# Patient Record
Sex: MALE | NOT HISPANIC OR LATINO | URBAN - METROPOLITAN AREA
[De-identification: names, ages, dates, MRNs, and addresses within clinical notes are randomized per-mention and may not be internally consistent; named-entity substitution may affect disease eponyms.]

---

## 2018-08-16 ENCOUNTER — IMPORTED ENCOUNTER (OUTPATIENT)
Dept: URBAN - METROPOLITAN AREA CLINIC 31 | Facility: CLINIC | Age: 73
End: 2018-08-16

## 2018-08-16 PROBLEM — H40.1232: Noted: 2018-08-16

## 2018-08-16 PROBLEM — Z96.1: Noted: 2018-08-16

## 2018-08-16 PROCEDURE — 99204 OFFICE O/P NEW MOD 45 MIN: CPT

## 2018-08-16 PROCEDURE — 92286 ANT SGM IMG I&R SPECLR MIC: CPT

## 2018-08-29 ENCOUNTER — IMPORTED ENCOUNTER (OUTPATIENT)
Dept: URBAN - METROPOLITAN AREA CLINIC 31 | Facility: CLINIC | Age: 73
End: 2018-08-29

## 2018-08-29 PROBLEM — Z98.89: Noted: 2018-08-29

## 2018-08-29 PROCEDURE — 99024 POSTOP FOLLOW-UP VISIT: CPT

## 2018-08-30 ENCOUNTER — IMPORTED ENCOUNTER (OUTPATIENT)
Dept: URBAN - METROPOLITAN AREA CLINIC 31 | Facility: CLINIC | Age: 73
End: 2018-08-30

## 2018-08-30 PROBLEM — Z98.89: Noted: 2018-08-30

## 2018-08-30 PROCEDURE — 99024 POSTOP FOLLOW-UP VISIT: CPT

## 2018-09-05 ENCOUNTER — IMPORTED ENCOUNTER (OUTPATIENT)
Dept: URBAN - METROPOLITAN AREA CLINIC 31 | Facility: CLINIC | Age: 73
End: 2018-09-05

## 2018-09-05 PROBLEM — Z98.89: Noted: 2018-09-05

## 2018-09-05 PROCEDURE — 99024 POSTOP FOLLOW-UP VISIT: CPT

## 2018-09-13 ENCOUNTER — IMPORTED ENCOUNTER (OUTPATIENT)
Dept: URBAN - METROPOLITAN AREA CLINIC 31 | Facility: CLINIC | Age: 73
End: 2018-09-13

## 2018-09-13 PROBLEM — Z98.89: Noted: 2018-09-13

## 2018-09-13 PROCEDURE — 99024 POSTOP FOLLOW-UP VISIT: CPT

## 2018-10-10 ENCOUNTER — IMPORTED ENCOUNTER (OUTPATIENT)
Dept: URBAN - METROPOLITAN AREA CLINIC 31 | Facility: CLINIC | Age: 73
End: 2018-10-10

## 2018-10-10 PROBLEM — Z96.1: Noted: 2018-10-10

## 2018-10-10 PROBLEM — H43.811: Noted: 2018-10-10

## 2018-10-10 PROBLEM — H40.1132: Noted: 2018-10-10

## 2018-10-10 PROBLEM — H43.813: Noted: 2018-10-10

## 2018-10-10 PROBLEM — Z98.89: Noted: 2018-10-10

## 2018-10-10 PROCEDURE — 99024 POSTOP FOLLOW-UP VISIT: CPT

## 2019-08-06 ENCOUNTER — UNSCHEDULED FOLLOW UP (OUTPATIENT)
Dept: URBAN - METROPOLITAN AREA CLINIC 26 | Facility: CLINIC | Age: 74
End: 2019-08-06

## 2019-08-06 VITALS
HEART RATE: 63 BPM | HEIGHT: 64 IN | BODY MASS INDEX: 23.56 KG/M2 | WEIGHT: 138 LBS | SYSTOLIC BLOOD PRESSURE: 116 MMHG | DIASTOLIC BLOOD PRESSURE: 68 MMHG

## 2019-08-06 DIAGNOSIS — H43.813: ICD-10-CM

## 2019-08-06 DIAGNOSIS — H04.123: ICD-10-CM

## 2019-08-06 DIAGNOSIS — H02.836: ICD-10-CM

## 2019-08-06 DIAGNOSIS — H40.1130: ICD-10-CM

## 2019-08-06 DIAGNOSIS — H02.833: ICD-10-CM

## 2019-08-06 DIAGNOSIS — H53.8: ICD-10-CM

## 2019-08-06 DIAGNOSIS — H35.373: ICD-10-CM

## 2019-08-06 PROCEDURE — 92014 COMPRE OPH EXAM EST PT 1/>: CPT

## 2019-08-06 PROCEDURE — 92250 FUNDUS PHOTOGRAPHY W/I&R: CPT

## 2019-08-06 PROCEDURE — 92083 EXTENDED VISUAL FIELD XM: CPT

## 2019-08-06 ASSESSMENT — TONOMETRY
OD_IOP_MMHG: 14
OS_IOP_MMHG: 15

## 2019-08-06 ASSESSMENT — VISUAL ACUITY
OD_CC: 20/30
OS_CC: 20/30-1

## 2020-02-25 ENCOUNTER — UNSCHEDULED FOLLOW UP (OUTPATIENT)
Dept: URBAN - METROPOLITAN AREA CLINIC 26 | Facility: CLINIC | Age: 75
End: 2020-02-25

## 2020-02-25 VITALS — WEIGHT: 136 LBS | BODY MASS INDEX: 23.22 KG/M2 | HEIGHT: 64 IN

## 2020-02-25 DIAGNOSIS — H35.373: ICD-10-CM

## 2020-02-25 DIAGNOSIS — H40.1130: ICD-10-CM

## 2020-02-25 DIAGNOSIS — H43.813: ICD-10-CM

## 2020-02-25 DIAGNOSIS — H53.8: ICD-10-CM

## 2020-02-25 PROCEDURE — 92014 COMPRE OPH EXAM EST PT 1/>: CPT

## 2020-02-25 PROCEDURE — 92250 FUNDUS PHOTOGRAPHY W/I&R: CPT

## 2020-02-25 PROCEDURE — 92134 CPTRZ OPH DX IMG PST SGM RTA: CPT

## 2020-02-25 ASSESSMENT — VISUAL ACUITY
OS_CC: 20/30-2
OD_CC: 20/30+1

## 2020-02-25 ASSESSMENT — TONOMETRY
OD_IOP_MMHG: 10
OS_IOP_MMHG: 8

## 2020-07-02 ENCOUNTER — OFFICE VISIT (OUTPATIENT)
Dept: URBAN - METROPOLITAN AREA CLINIC 60 | Facility: CLINIC | Age: 75
End: 2020-07-02

## 2020-09-09 ENCOUNTER — OFFICE VISIT (OUTPATIENT)
Dept: URBAN - METROPOLITAN AREA SURGERY CENTER 4 | Facility: SURGERY CENTER | Age: 75
End: 2020-09-09

## 2022-04-02 ASSESSMENT — TONOMETRY
OS_IOP_MMHG: 13
OS_IOP_MMHG: 17
OD_IOP_MMHG: 15
OD_IOP_MMHG: 15
OD_IOP_MMHG: 9
OD_IOP_MMHG: 10
OS_IOP_MMHG: 16
OD_IOP_MMHG: 10
OD_IOP_MMHG: 12
OS_IOP_MMHG: 15
OS_IOP_MMHG: 13
OD_IOP_MMHG: 37
OS_IOP_MMHG: 14

## 2022-04-02 ASSESSMENT — VISUAL ACUITY
OS_SC: 20/40
OD_SC: 20/40
OS_SC: 20/30-1
OS_SC: 20/40
OD_SC: 20/40
OS_SC: 20/30-2
OD_SC: 20/30-2
OD_SC: 20/30-2
OS_SC: 20/30-2
OD_SC: 20/30-2

## 2022-04-02 ASSESSMENT — PACHYMETRY
OS_CT_UM: 698
OD_CT_UM: 572

## 2022-07-08 ENCOUNTER — ESTABLISHED PATIENT (OUTPATIENT)
Dept: URBAN - METROPOLITAN AREA CLINIC 29 | Facility: CLINIC | Age: 77
End: 2022-07-08

## 2022-07-08 DIAGNOSIS — Z96.1: ICD-10-CM

## 2022-07-08 DIAGNOSIS — H40.1132: ICD-10-CM

## 2022-07-08 DIAGNOSIS — H43.811: ICD-10-CM

## 2022-07-08 DIAGNOSIS — H21.561: ICD-10-CM

## 2022-07-08 PROCEDURE — 92014 COMPRE OPH EXAM EST PT 1/>: CPT

## 2022-07-08 ASSESSMENT — TONOMETRY
OS_IOP_MMHG: 17
OD_IOP_MMHG: 15

## 2022-07-08 ASSESSMENT — VISUAL ACUITY
OD_CC: 20/30
OU_CC: 20/30
OS_CC: 20/40

## 2022-07-09 ENCOUNTER — TELEPHONE ENCOUNTER (OUTPATIENT)
Dept: URBAN - METROPOLITAN AREA CLINIC 121 | Facility: CLINIC | Age: 77
End: 2022-07-09

## 2022-07-09 RX ORDER — CELECOXIB 200 MG/1
CAPSULE ORAL ONCE A DAY
Refills: 0 | OUTPATIENT
Start: 2020-07-02 | End: 2020-07-02

## 2022-07-09 RX ORDER — TAMSULOSIN HYDROCHLORIDE 0.4 MG/1
CAPSULE ORAL ONCE A DAY
Refills: 0 | OUTPATIENT
Start: 2018-04-21 | End: 2020-07-02

## 2022-07-09 RX ORDER — TAMSULOSIN HYDROCHLORIDE 0.4 MG/1
CAPSULE ORAL ONCE A DAY
Refills: 0 | OUTPATIENT
Start: 2020-06-13 | End: 2020-07-02

## 2022-07-09 RX ORDER — TAMSULOSIN HYDROCHLORIDE 0.4 MG/1
CAPSULE ORAL ONCE A DAY
Refills: 0 | OUTPATIENT
Start: 2020-07-02 | End: 2020-07-02

## 2022-07-09 RX ORDER — PAROXETINE HYDROCHLORIDE 40 MG/1
TABLET, FILM COATED ORAL ONCE A DAY
Refills: 0 | OUTPATIENT
Start: 2020-07-02 | End: 2020-07-02

## 2022-07-10 ENCOUNTER — TELEPHONE ENCOUNTER (OUTPATIENT)
Dept: URBAN - METROPOLITAN AREA CLINIC 121 | Facility: CLINIC | Age: 77
End: 2022-07-10

## 2022-07-10 RX ORDER — TAMSULOSIN HYDROCHLORIDE 0.4 MG/1
CAPSULE ORAL ONCE A DAY
Refills: 0 | Status: ACTIVE | COMMUNITY
Start: 2020-07-02

## 2022-07-10 RX ORDER — SIMETHICONE 250 MG/1
TWICE A DAY CAPSULE, GELATIN COATED ORAL TWICE A DAY
Refills: 3 | Status: ACTIVE | COMMUNITY
Start: 2020-07-02

## 2022-07-10 RX ORDER — OMEPRAZOLE 20 MG/1
CAPSULE, DELAYED RELEASE ORAL ONCE A DAY
Refills: 0 | Status: ACTIVE | COMMUNITY
Start: 2020-07-02

## 2022-08-29 ENCOUNTER — TELEPHONE ENCOUNTER (OUTPATIENT)
Dept: URBAN - METROPOLITAN AREA CLINIC 63 | Facility: CLINIC | Age: 77
End: 2022-08-29

## 2022-09-02 ENCOUNTER — DASHBOARD ENCOUNTERS (OUTPATIENT)
Age: 77
End: 2022-09-02

## 2022-09-02 ENCOUNTER — WEB ENCOUNTER (OUTPATIENT)
Dept: URBAN - METROPOLITAN AREA CLINIC 63 | Facility: CLINIC | Age: 77
End: 2022-09-02

## 2022-09-02 ENCOUNTER — OFFICE VISIT (OUTPATIENT)
Dept: URBAN - METROPOLITAN AREA CLINIC 63 | Facility: CLINIC | Age: 77
End: 2022-09-02
Payer: MEDICARE

## 2022-09-02 VITALS
SYSTOLIC BLOOD PRESSURE: 118 MMHG | BODY MASS INDEX: 23.29 KG/M2 | WEIGHT: 139.8 LBS | TEMPERATURE: 97.1 F | OXYGEN SATURATION: 98 % | DIASTOLIC BLOOD PRESSURE: 60 MMHG | HEART RATE: 76 BPM | HEIGHT: 65 IN

## 2022-09-02 DIAGNOSIS — R19.7 DIARRHEA: ICD-10-CM

## 2022-09-02 DIAGNOSIS — Z86.010 HISTORY OF COLON POLYPS: ICD-10-CM

## 2022-09-02 DIAGNOSIS — K21.9 GERD: ICD-10-CM

## 2022-09-02 PROBLEM — 235595009 GASTROESOPHAGEAL REFLUX DISEASE: Status: ACTIVE | Noted: 2022-09-02

## 2022-09-02 PROBLEM — 428283002 HISTORY OF POLYP OF COLON: Status: ACTIVE | Noted: 2022-09-02

## 2022-09-02 PROCEDURE — 99213 OFFICE O/P EST LOW 20 MIN: CPT | Performed by: INTERNAL MEDICINE

## 2022-09-02 RX ORDER — OMEPRAZOLE 20 MG/1
CAPSULE, DELAYED RELEASE ORAL ONCE A DAY
Refills: 0 | Status: ACTIVE | COMMUNITY
Start: 2020-07-02

## 2022-09-02 RX ORDER — BRIMONIDINE/DORZOLAMIDE/PF 0.15 %-2 %
AS DIRECTED DROPS OPHTHALMIC (EYE)
Status: ACTIVE | COMMUNITY

## 2022-09-02 RX ORDER — TAMSULOSIN HYDROCHLORIDE 0.4 MG/1
CAPSULE ORAL ONCE A DAY
Refills: 0 | Status: ACTIVE | COMMUNITY
Start: 2020-07-02

## 2022-09-02 RX ORDER — DULOXETINE 30 MG/1
1 CAPSULE CAPSULE, DELAYED RELEASE PELLETS ORAL ONCE A DAY
Status: ACTIVE | COMMUNITY

## 2022-09-02 NOTE — PHYSICAL EXAM GASTROINTESTINAL
soft, nontender, nondistended , no masses palpable , normal bowel sounds , Liver and Spleen NON PALPABLE

## 2022-09-02 NOTE — HPI-PREVIOUS LABS
Labs August 2022: WBC 4.8, hemoglobin 12.8 g MCV 89 platelets 224 Normal electrolytes, BUN 14 creatinine 0.72, normal liver enzymes, serum albumin 4.1  Labs 2019: Normal CBC and CMP and normal lipid panel Labs April 2018 chromogranin 31

## 2022-09-02 NOTE — HPI-TODAY'S VISIT:
Liz is a pleasant 76-year-old male who is here seeking evaluation for abdominal discomfort and diarrhea. He has a history of colon polyps.  Last colonoscopy was done in 2018. He underwent a right inguinal hernia repair 2 weeks ago.  Following the surgery he developed significant constipation for which he took 3 tablets of Dulcolax and subsequently developed diarrhea.  The diarrhea has eventually subsided.  Now he reports epigastric burning and acidity and reflux.  He is taking omeprazole 20 mg.  Denies any use of NSAIDs.  Denies any bloating nausea or vomiting.  Denies any rectal bleeding or melena or any unintentional weight loss or loss of appetite. Labs as noted below.

## 2022-09-02 NOTE — HPI-PREVIOUS PROCEDURES
Colonoscopy 2018: Good prep, normal TI, 5 mm hepatic flexure polyp-benign mucosa, 4 mm sigmoid adenoma, 7 mm sigmoid adenoma, grade 2 external hemorrhoids  Colonoscopy 2017-Dr. Garcia: 1 cm flat polyp in the distal transverse colon removed piecemeal with hemoclips to prevent post polypectomy bleed-pathology tubular adenoma, 2 small subcentimeter polyps in the sigmoid-adenoma, carcinoid (low mitotic activity and <1% Ki proliferation index.  egd- 2010/2013 - Dr Webb- small HH , moderate gastritis-  HP negative  2010 - colonoscopy - Dr Webb - TC polyp ( tubulovillous adenoma)

## 2022-09-06 ENCOUNTER — OFFICE VISIT (OUTPATIENT)
Dept: URBAN - METROPOLITAN AREA CLINIC 60 | Facility: CLINIC | Age: 77
End: 2022-09-06